# Patient Record
Sex: MALE | Race: BLACK OR AFRICAN AMERICAN
[De-identification: names, ages, dates, MRNs, and addresses within clinical notes are randomized per-mention and may not be internally consistent; named-entity substitution may affect disease eponyms.]

---

## 2020-07-15 ENCOUNTER — DIAGNOSTIC TRANS (OUTPATIENT)
Dept: OTHER | Age: 35
End: 2020-07-15

## 2020-07-15 ENCOUNTER — HOSPITAL (OUTPATIENT)
Dept: OTHER | Age: 35
End: 2020-07-15

## 2020-07-15 LAB
ALBUMIN SERPL-MCNC: 3.9 G/DL (ref 3.6–5.1)
ALBUMIN/GLOB SERPL: 1 {RATIO} (ref 1–2.4)
ALP SERPL-CCNC: 45 UNITS/L (ref 45–117)
ALT SERPL-CCNC: 34 UNITS/L
ANALYZER ANC (IANC): NORMAL
ANION GAP SERPL CALC-SCNC: 11 MMOL/L (ref 10–20)
AST SERPL-CCNC: 28 UNITS/L
BASOPHILS # BLD: 0 K/MCL (ref 0–0.3)
BASOPHILS NFR BLD: 0 %
BILIRUB SERPL-MCNC: 1.3 MG/DL (ref 0.2–1)
BUN SERPL-MCNC: 8 MG/DL (ref 6–20)
BUN/CREAT SERPL: 8 (ref 7–25)
CALCIUM SERPL-MCNC: 9.4 MG/DL (ref 8.4–10.2)
CHLORIDE SERPL-SCNC: 103 MMOL/L (ref 98–107)
CO2 SERPL-SCNC: 29 MMOL/L (ref 21–32)
CREAT SERPL-MCNC: 0.95 MG/DL (ref 0.67–1.17)
DIFFERENTIAL METHOD BLD: NORMAL
EOSINOPHIL # BLD: 0.2 K/MCL (ref 0.1–0.5)
EOSINOPHIL NFR BLD: 2 %
ERYTHROCYTE [DISTWIDTH] IN BLOOD: 12.2 % (ref 11–15)
GLOBULIN SER-MCNC: 4.1 G/DL (ref 2–4)
GLUCOSE SERPL-MCNC: 95 MG/DL (ref 65–99)
HCT VFR BLD CALC: 47.3 % (ref 39–51)
HGB BLD-MCNC: 15.4 G/DL (ref 13–17)
IMM GRANULOCYTES # BLD AUTO: 0 K/MCL (ref 0–0.2)
IMM GRANULOCYTES NFR BLD: 0 %
LYMPHOCYTES # BLD: 2 K/MCL (ref 1–4.8)
LYMPHOCYTES NFR BLD: 28 %
MAGNESIUM SERPL-MCNC: 2.4 MG/DL (ref 1.7–2.4)
MCH RBC QN AUTO: 29.7 PG (ref 26–34)
MCHC RBC AUTO-ENTMCNC: 32.6 G/DL (ref 32–36.5)
MCV RBC AUTO: 91.1 FL (ref 78–100)
MONOCYTES # BLD: 0.6 K/MCL (ref 0.3–0.9)
MONOCYTES NFR BLD: 8 %
NEUTROPHILS # BLD: 4.4 K/MCL (ref 1.8–7.7)
NEUTROPHILS NFR BLD: 62 %
NEUTS SEG NFR BLD: NORMAL %
NRBC (NRBCRE): 0 /100 WBC
PLATELET # BLD: 271 K/MCL (ref 140–450)
POTASSIUM SERPL-SCNC: 4 MMOL/L (ref 3.4–5.1)
POTASSIUM SERPL-SCNC: ABNORMAL MMOL/L
PROT SERPL-MCNC: 8 G/DL (ref 6.4–8.2)
RBC # BLD: 5.19 MIL/MCL (ref 4.5–5.9)
SODIUM SERPL-SCNC: 139 MMOL/L (ref 135–145)
TROPONIN I SERPL HS-MCNC: <0.02 NG/ML
WBC # BLD: 7.3 K/MCL (ref 4.2–11)

## 2020-07-15 PROCEDURE — 99285 EMERGENCY DEPT VISIT HI MDM: CPT | Performed by: STUDENT IN AN ORGANIZED HEALTH CARE EDUCATION/TRAINING PROGRAM

## 2024-05-24 ENCOUNTER — HOSPITAL ENCOUNTER (OUTPATIENT)
Age: 39
Discharge: HOME | End: 2024-05-24

## 2024-05-24 VITALS
TEMPERATURE: 99 F | DIASTOLIC BLOOD PRESSURE: 82 MMHG | SYSTOLIC BLOOD PRESSURE: 126 MMHG | OXYGEN SATURATION: 98 % | HEART RATE: 77 BPM | RESPIRATION RATE: 20 BRPM

## 2024-05-24 DIAGNOSIS — L73.2 HIDRADENITIS SUPPURATIVA: ICD-10-CM

## 2024-05-24 DIAGNOSIS — L02.412 ABSCESS OF LEFT AXILLA: Primary | ICD-10-CM

## 2024-05-24 RX ORDER — DOXYCYCLINE HYCLATE 100 MG/1
100 CAPSULE ORAL 2 TIMES DAILY
Qty: 20 CAPSULE | Refills: 0 | Status: SHIPPED | OUTPATIENT
Start: 2024-05-24 | End: 2024-06-03

## 2024-05-24 NOTE — ED PROVIDER NOTES
Patient Seen in: [facility]      History     Chief Complaint   Patient presents with    Abscess     Stated Complaint: KNOT UNDER THE ARM    Subjective:   HPI    Patient is a 38-year-old male with hidradenitis suppurativa, presenting to immediate care for evaluation of infected boil of left axilla.  Onset: 3 days.  Associated increased pain, swelling, redness, and warmth.  No associated drainage.  Pain is mild to moderate.  Exacerbated with palpation.  Has been applying warm compress with no relief.  Reports similar episode in the past requiring incision and drainage.  Denies any fevers or chills or myalgias.  Not diabetic.  Not immunocompromise.    Objective:   History reviewed. No pertinent past medical history.           History reviewed. No pertinent surgical history.             Social History     Socioeconomic History    Marital status: Single   Tobacco Use    Smoking status: Never    Smokeless tobacco: Never     Social Determinants of Health     Financial Resource Strain: Not on File (3/5/2022)    Received from [facility]     Financial Resource Strain     Financial Resource Strain: 0   Food Insecurity: Not on File (3/5/2022)    Received from [facility]     Food Insecurity     Food: 0   Transportation Needs: Not on File (3/5/2022)    Received from [facility]     Transportation Needs     Transportation: 0   Physical Activity: Not on File (3/5/2022)    Received from [facility]     Physical Activity     Physical Activity: 0   Stress: Not on File (3/5/2022)    Received from [facility]     Stress     Stress: 0   Social Connections: Not on File (3/5/2022)    Received from [facility]     Social Connections     Social Connections and Isolation: 0    Received from [facility]    Housing Stability              Review of Systems   Constitutional:  Negative for chills and fever.   Gastrointestinal:  Negative for nausea and vomiting.   Musculoskeletal:  Negative for back pain.   Skin:         Boil left axilla    Allergic/Immunologic: Negative for immunocompromised state.   Psychiatric/Behavioral:  Negative for confusion.    All other systems reviewed and are negative.      Positive for stated complaint: KNOT UNDER THE ARM  Other systems are as noted in HPI.  Constitutional and vital signs reviewed.      All other systems reviewed and negative except as noted above.    Physical Exam     ED Triage Vitals [05/24/24 1326]   /82   Pulse 77   Resp 20   Temp 98.5 °F (36.9 °C)   Temp src Temporal   SpO2 98 %   O2 Device None (Room air)       Current Vitals:   Vital Signs  BP: 126/82  Pulse: 77  Resp: 20  Temp: 98.5 °F (36.9 °C)  Temp src: Temporal    Oxygen Therapy  SpO2: 98 %  O2 Device: None (Room air)            Physical Exam  Vitals and nursing note reviewed.   Constitutional:       General: He is not in acute distress.     Appearance: Normal appearance. He is not ill-appearing, toxic-appearing or diaphoretic.   HENT:      Head: Normocephalic and atraumatic.      Mouth/Throat:      Mouth: Mucous membranes are moist.   Eyes:      Conjunctiva/sclera: Conjunctivae normal.   Cardiovascular:      Rate and Rhythm: Normal rate.      Pulses: Normal pulses.   Pulmonary:      Effort: Pulmonary effort is normal. No respiratory distress.   Musculoskeletal:         General: No swelling or deformity. Normal range of motion.      Comments: Approximate 3.5 cm abscess that is indurated and fluctuant with surrounding erythema and warmth on left axilla.  Tender to palpation.  No drainage.  There is several adjacent hyperpigmented sinus tracts/scarring without current infection.   Neurological:      General: No focal deficit present.      Mental Status: He is alert and oriented to person, place, and time.      Motor: No weakness.      Gait: Gait normal.   Psychiatric:         Mood and Affect: Mood normal.         Behavior: Behavior normal.             ED Course   Labs Reviewed - No data to display  Procedure: Incision and drainage -  Abscess left axilla  Verbal Consent  Time: 2 PM  Site: Left axilla  Size: 3.5 cm diameter, ovoid  + surrounding cellulitis  Removal: Patient placed in supine position   Area cleaned with betadine  Local anesthetic with 1% lidocaine without epinephrine (2ml)  Single 1.5 cm incision performed, 11 blade  + brown purulent drainage, foul smelling - 3cc  Wound irrigated, probed, and deloculated  No packing  Patient tolerated without difficulty, no complication  Gauze dressing with silk tape applied to incision site  Wound care instructions provided           [facility]     Dx: Left Axilla Abscess, initial encounter  Dx: Hidradenitis suppurativa  Onset: 3 days  + cellulitis  No systemic symptoms  Not immunocompromised  Incision and drainage  No packing   Wound care instructions provided  Rx oral doxycycline for infected abscess with cellulitis  PCP dermatology follow-up  ED Return Precautions                                 Medical Decision Making      Disposition and Plan     Clinical Impression:  1. Abscess of left axilla    2. Hidradenitis suppurativa         Disposition:  Discharge  5/24/2024  2:35 pm    Follow-up:  Debra Mondragon  [address] [place] [address] [address]  [address] [place] [id number]  [phone]      Dermatology          Medications Prescribed:  Current Discharge Medication List        START taking these medications    Details   doxycycline 100 MG Oral Cap Take 1 capsule (100 mg total) by mouth 2 (two) times daily for 10 days.  Qty: 20 capsule, Refills: 0

## 2025-07-09 ENCOUNTER — HOSPITAL ENCOUNTER (OUTPATIENT)
Age: 40
Discharge: HOME | End: 2025-07-09
Payer: MEDICAID

## 2025-07-09 VITALS
RESPIRATION RATE: 20 BRPM | DIASTOLIC BLOOD PRESSURE: 74 MMHG | SYSTOLIC BLOOD PRESSURE: 122 MMHG | OXYGEN SATURATION: 99 % | HEART RATE: 67 BPM | TEMPERATURE: 98 F

## 2025-07-09 DIAGNOSIS — Z71.1 CONCERN ABOUT STD IN MALE WITHOUT DIAGNOSIS: Primary | ICD-10-CM

## 2025-07-09 PROBLEM — J34.2 DEVIATED SEPTUM: Status: ACTIVE | Noted: 2020-10-08

## 2025-07-09 PROBLEM — F39 MOOD DISORDER: Chronic | Status: ACTIVE | Noted: 2021-04-13

## 2025-07-09 PROCEDURE — 99213 OFFICE O/P EST LOW 20 MIN: CPT | Performed by: NURSE PRACTITIONER

## 2025-07-09 NOTE — DISCHARGE INSTRUCTIONS
- No sex for 1-week and until you know your culture results (approx. 2-3 days via MyChart)   - If culture results are positive, you and your partner(s) may need to be treated   - Wear a condom every time you have sex   - If applicable, no alcohol while taking antibiotics and for 3-days afterwards as alcohol decreased antibiotic effectiveness    - Follow up with your primary care provider or local health clinic for additional testing via blood work (e.g. Syphilis, HIV, Hepatitis B)

## 2025-07-09 NOTE — ED PROVIDER NOTES
History     Chief Complaint   Patient presents with    Sexually Transmitted Infection Screen       Subjective:   HPI    Tyler JOSE JUAN Truong, 39 year old male with notable medical history of n/a who presents with concern STD. Patient reports having unprotected intercourse with his female partner 1-week ago and reports his partner started to bleed and developed malodorous discharge. Patient reports his partner's menstrual cycle ended a few days prior to intercourse. He reports typically using a condom. He reports some intermittent itching after urination, but denies pain, discharge, rashes/lesions, testicular changes.       Problem List[1]   Objective:   History reviewed. No pertinent past medical history.           History reviewed. No pertinent surgical history.             Social History     Socioeconomic History    Marital status: Single   Tobacco Use    Smoking status: Never    Smokeless tobacco: Never     Social Drivers of Health     Food Insecurity: Not on File (3/5/2022)    Received from [facility]    Food [employer]     Food: 0   Transportation Needs: Not on File (3/5/2022)    Received from [facility]    Transportation Needs     Transportation: 0    Received from [facility]    Housing Stability              Medications Ordered Prior to Encounter[2]      Constitutional and vital signs reviewed.      All other systems reviewed and negative except as noted above.    I have reviewed the family history, social history, allergies, and outpatient medications.     History reviewed from EMR: Encounters, problem list, allergies, medications      Physical Exam     ED Triage Vitals [07/09/25 1337]   /74   Pulse 67   Resp 20   Temp 97.6 °F (36.4 °C)   Temp src Oral   SpO2 99 %   O2 Device None (Room air)       Current:/74   Pulse 67   Temp 97.6 °F (36.4 °C) (Oral)   Resp 20   SpO2 99%       Physical Exam  Vitals and nursing note reviewed.   Constitutional:       General: He is not in acute  distress.     Appearance: Normal appearance. He is normal weight. He is not ill-appearing or toxic-appearing.   HENT:      Head: Normocephalic and atraumatic.      Right Ear: External ear normal.      Left Ear: External ear normal.      Nose: Nose normal. No congestion or rhinorrhea.      Mouth/Throat:      Mouth: Mucous membranes are moist.   Eyes:      Extraocular Movements: Extraocular movements intact.      Conjunctiva/sclera: Conjunctivae normal.      Pupils: Pupils are equal, round, and reactive to light.   Cardiovascular:      Rate and Rhythm: Normal rate.      Pulses: Normal pulses.   Pulmonary:      Effort: Pulmonary effort is normal. No respiratory distress.   Genitourinary:     Penis: Normal and circumcised.       Testes: Normal. Cremasteric reflex is present.      Epididymis:      Right: Normal.      Left: Normal.   Musculoskeletal:         General: No swelling, tenderness or signs of injury. Normal range of motion.      Cervical back: Normal range of motion.   Skin:     General: Skin is warm and dry.      Capillary Refill: Capillary refill takes less than 2 seconds.   Neurological:      General: No focal deficit present.      Mental Status: He is alert and oriented to person, place, and time. Mental status is at baseline.   Psychiatric:         Mood and Affect: Mood normal.         Behavior: Behavior normal.         Thought Content: Thought content normal.         Judgment: Judgment normal.            ED Course     Labs Reviewed   CHLAMYDIA/GONOCOCCUS, ZULLY     No orders to display       Vitals:    07/09/25 1337   BP: 122/74   Pulse: 67   Resp: 20   Temp: 97.6 °F (36.4 °C)   TempSrc: Oral   SpO2: 99%            [facility]        Tyler Guerin, 39 year old male with medical history as noted above who presents with concern STD   - Patient in [facility]   - Patient is w/o symptoms with benign exam   - Gc/chlamydia culture sent   - Safe sex measures discussed   - Follow up with primary care provider as needed         ** See ED course below for additional information on care provided / interventions / notable events throughout patient's encounter.           ** I have independently reviewed the radiology images, clinical lab results, and ECG tracings as described above (if applicable)    ** Concerning co-morbidities possibly affecting complaint / care: n/a        Medical Decision Making  Amount and/or Complexity of Data Reviewed  Labs: ordered. Decision-making details documented in ED Course.    Risk  OTC drugs.        Disposition and Plan     Disposition:  Discharge  7/9/2025  2:25 pm    Clinical Impression:  1. Concern about STD in male without diagnosis            Home care instructions:       - No sex for 1-week and until you know your culture results (approx. 2-3 days via MyChart)   - If culture results are positive, you and your partner(s) may need to be treated   - Wear a condom every time you have sex   - If applicable, no alcohol while taking antibiotics and for 3-days afterwards as alcohol decreased antibiotic effectiveness    - Follow up with your primary care provider or local health clinic for additional testing via blood work (e.g. Syphilis, HIV, Hepatitis B)       Follow-up:  [address] [address]  [phone]    [facility] to establish care (if needed)          Medications Prescribed:  There are no discharge medications for this patient.        Himanshu Teresa, DNP, APRN, AGACNP-BC, FNP-C, CNL  Adult-Gerontology Acute Care & Family Nurse Practitioner  [facility]      The above patient (and/or guardian) was made aware that an appropriate evaluation has been performed, and that no additional testing is required at this time. In my medical judgment, there is currently no evidence of an immediate life-threatening or surgical condition, therefore discharge is indicated at this time. The patient (and/or guardian) was advised that a small risk still exists  that a serious condition could develop. The patient was instructed to arrange close follow-up with their primary care provider (or the referral provider given today). The patient received written and verbal instructions regarding their condition / concerns, demonstrated understanding, and is agreement with the outpatient treatment plan.              [1]   Patient Active Problem List  Diagnosis    Hidradenitis suppurativa    Deviated septum    Mood disorder   [2]   No current facility-administered medications on file prior to encounter.     No current outpatient medications on file prior to encounter.

## 2025-07-09 NOTE — ED INITIAL ASSESSMENT (HPI)
Pt presents with request for STI testing. Pt reports, \"was having sex and the girl started to bleed, I just want to be checked out\".     No penile discharge reported.

## 2025-07-10 LAB
C TRACH DNA SPEC QL NAA+PROBE: NEGATIVE
N GONORRHOEA DNA SPEC QL NAA+PROBE: NEGATIVE